# Patient Record
Sex: FEMALE | Race: WHITE | NOT HISPANIC OR LATINO | Employment: UNEMPLOYED | URBAN - METROPOLITAN AREA
[De-identification: names, ages, dates, MRNs, and addresses within clinical notes are randomized per-mention and may not be internally consistent; named-entity substitution may affect disease eponyms.]

---

## 2017-07-14 ENCOUNTER — LAB VISIT (OUTPATIENT)
Dept: LAB | Facility: HOSPITAL | Age: 32
End: 2017-07-14
Attending: INTERNAL MEDICINE
Payer: COMMERCIAL

## 2017-07-14 DIAGNOSIS — D50.0 ANEMIA DUE TO CHRONIC BLOOD LOSS: ICD-10-CM

## 2017-07-14 LAB
BASOPHILS # BLD AUTO: 0.04 K/UL
BASOPHILS NFR BLD: 0.4 %
DIFFERENTIAL METHOD: ABNORMAL
EOSINOPHIL # BLD AUTO: 0.3 K/UL
EOSINOPHIL NFR BLD: 3.4 %
ERYTHROCYTE [DISTWIDTH] IN BLOOD BY AUTOMATED COUNT: 22.5 %
FERRITIN SERPL-MCNC: 4 NG/ML
GIANT PLATELETS BLD QL SMEAR: PRESENT
HCT VFR BLD AUTO: 26.2 %
HGB BLD-MCNC: 7.2 G/DL
IRON SATN MFR SERPL: 10 %
IRON SERPL-MCNC: 59 UG/DL
LYMPHOCYTES # BLD AUTO: 2 K/UL
LYMPHOCYTES NFR BLD: 21 %
MCH RBC QN AUTO: 19 PG
MCHC RBC AUTO-ENTMCNC: 27.5 %
MCV RBC AUTO: 69 FL
MONOCYTES # BLD AUTO: 0.6 K/UL
MONOCYTES NFR BLD: 6.4 %
NEUTROPHILS # BLD AUTO: 6.4 K/UL
NEUTROPHILS NFR BLD: 68.8 %
NRBC BLD-RTO: 0 /100 WBC
PLATELET # BLD AUTO: 274 K/UL
PMV BLD AUTO: 9.1 FL
RBC # BLD AUTO: 3.79 M/UL
TOTAL IRON BINDING CAPACITY: 584 UG/DL
WBC # BLD AUTO: 9.28 K/UL

## 2017-07-14 PROCEDURE — 85025 COMPLETE CBC W/AUTO DIFF WBC: CPT | Mod: PN

## 2017-07-14 PROCEDURE — 85025 COMPLETE CBC W/AUTO DIFF WBC: CPT

## 2017-07-14 PROCEDURE — 82728 ASSAY OF FERRITIN: CPT | Mod: PN

## 2017-07-14 PROCEDURE — 83540 ASSAY OF IRON: CPT | Mod: PN

## 2017-07-14 PROCEDURE — 36415 COLL VENOUS BLD VENIPUNCTURE: CPT | Mod: PN

## 2017-07-14 PROCEDURE — 83540 ASSAY OF IRON: CPT

## 2017-07-14 PROCEDURE — 82728 ASSAY OF FERRITIN: CPT

## 2017-07-24 PROBLEM — D64.9 ANEMIA: Status: ACTIVE | Noted: 2017-07-24

## 2017-07-25 PROBLEM — R82.71 BACTERIURIA DURING PREGNANCY: Status: ACTIVE | Noted: 2017-07-25

## 2017-07-25 PROBLEM — M54.9 BACK PAIN: Status: ACTIVE | Noted: 2017-07-25

## 2017-07-25 PROBLEM — O99.891 BACTERIURIA DURING PREGNANCY: Status: ACTIVE | Noted: 2017-07-25

## 2017-08-10 ENCOUNTER — LAB VISIT (OUTPATIENT)
Dept: LAB | Facility: HOSPITAL | Age: 32
End: 2017-08-10
Attending: INTERNAL MEDICINE
Payer: COMMERCIAL

## 2017-08-10 DIAGNOSIS — D50.0 ANEMIA DUE TO CHRONIC BLOOD LOSS: ICD-10-CM

## 2017-08-10 LAB
ALBUMIN SERPL BCP-MCNC: 3.6 G/DL
ALP SERPL-CCNC: 68 U/L
ALT SERPL W/O P-5'-P-CCNC: 18 U/L
ANION GAP SERPL CALC-SCNC: 6 MMOL/L
ANISOCYTOSIS BLD QL SMEAR: SLIGHT
AST SERPL-CCNC: 17 U/L
BASOPHILS # BLD AUTO: 0.05 K/UL
BASOPHILS NFR BLD: 0.6 %
BILIRUB SERPL-MCNC: 0.6 MG/DL
BUN SERPL-MCNC: 10 MG/DL
CALCIUM SERPL-MCNC: 9.2 MG/DL
CHLORIDE SERPL-SCNC: 105 MMOL/L
CO2 SERPL-SCNC: 24 MMOL/L
CREAT SERPL-MCNC: 0.58 MG/DL
DIFFERENTIAL METHOD: ABNORMAL
EOSINOPHIL # BLD AUTO: 0.3 K/UL
EOSINOPHIL NFR BLD: 3.6 %
ERYTHROCYTE [DISTWIDTH] IN BLOOD BY AUTOMATED COUNT: ABNORMAL %
EST. GFR  (AFRICAN AMERICAN): >60 ML/MIN/1.73 M^2
EST. GFR  (NON AFRICAN AMERICAN): >60 ML/MIN/1.73 M^2
FERRITIN SERPL-MCNC: 42 NG/ML
GLUCOSE SERPL-MCNC: 66 MG/DL
HCT VFR BLD AUTO: 34.1 %
HGB BLD-MCNC: 10.5 G/DL
IRON SATN MFR SERPL: 13 %
IRON SERPL-MCNC: 67 UG/DL
IRON SERPL-MCNC: 67 UG/DL
LYMPHOCYTES # BLD AUTO: 1.9 K/UL
LYMPHOCYTES NFR BLD: 23.6 %
MCH RBC QN AUTO: 24.9 PG
MCHC RBC AUTO-ENTMCNC: 30.8 G/DL
MCV RBC AUTO: 81 FL
MONOCYTES # BLD AUTO: 0.5 K/UL
MONOCYTES NFR BLD: 5.7 %
NEUTROPHILS # BLD AUTO: 5.2 K/UL
NEUTROPHILS NFR BLD: 66.5 %
NRBC BLD-RTO: 0 /100 WBC
PLATELET # BLD AUTO: 183 K/UL
PMV BLD AUTO: 9.6 FL
POLYCHROMASIA BLD QL SMEAR: ABNORMAL
POTASSIUM SERPL-SCNC: 4.5 MMOL/L
PROT SERPL-MCNC: 6.7 G/DL
RBC # BLD AUTO: 4.21 M/UL
SODIUM SERPL-SCNC: 135 MMOL/L
TOTAL IRON BINDING CAPACITY: 518 UG/DL
WBC # BLD AUTO: 7.88 K/UL

## 2017-08-10 PROCEDURE — 80053 COMPREHEN METABOLIC PANEL: CPT | Mod: PN

## 2017-08-10 PROCEDURE — 36415 COLL VENOUS BLD VENIPUNCTURE: CPT | Mod: PN

## 2017-08-10 PROCEDURE — 83540 ASSAY OF IRON: CPT | Mod: PN

## 2017-08-10 PROCEDURE — 82728 ASSAY OF FERRITIN: CPT

## 2017-08-10 PROCEDURE — 85025 COMPLETE CBC W/AUTO DIFF WBC: CPT | Mod: PN

## 2017-08-10 PROCEDURE — 83540 ASSAY OF IRON: CPT

## 2017-08-10 PROCEDURE — 82728 ASSAY OF FERRITIN: CPT | Mod: PN

## 2017-08-10 PROCEDURE — 85025 COMPLETE CBC W/AUTO DIFF WBC: CPT

## 2017-08-10 PROCEDURE — 80053 COMPREHEN METABOLIC PANEL: CPT

## 2017-09-07 ENCOUNTER — LAB VISIT (OUTPATIENT)
Dept: LAB | Facility: HOSPITAL | Age: 32
End: 2017-09-07
Attending: INTERNAL MEDICINE
Payer: COMMERCIAL

## 2017-09-07 DIAGNOSIS — D50.0 ANEMIA DUE TO CHRONIC BLOOD LOSS: ICD-10-CM

## 2017-09-07 LAB
ALBUMIN SERPL BCP-MCNC: 3.5 G/DL
ALP SERPL-CCNC: 89 U/L
ALT SERPL W/O P-5'-P-CCNC: 21 U/L
ANION GAP SERPL CALC-SCNC: 7 MMOL/L
ANISOCYTOSIS BLD QL SMEAR: SLIGHT
AST SERPL-CCNC: 16 U/L
BASOPHILS # BLD AUTO: 0.04 K/UL
BASOPHILS NFR BLD: 0.3 %
BILIRUB SERPL-MCNC: 0.5 MG/DL
BUN SERPL-MCNC: 13 MG/DL
CALCIUM SERPL-MCNC: 9.1 MG/DL
CHLORIDE SERPL-SCNC: 107 MMOL/L
CO2 SERPL-SCNC: 23 MMOL/L
CREAT SERPL-MCNC: 0.66 MG/DL
DIFFERENTIAL METHOD: ABNORMAL
DOHLE BOD BLD QL SMEAR: PRESENT
EOSINOPHIL # BLD AUTO: 0.2 K/UL
EOSINOPHIL NFR BLD: 1.4 %
ERYTHROCYTE [DISTWIDTH] IN BLOOD BY AUTOMATED COUNT: 22.7 %
EST. GFR  (AFRICAN AMERICAN): >60 ML/MIN/1.73 M^2
EST. GFR  (NON AFRICAN AMERICAN): >60 ML/MIN/1.73 M^2
FERRITIN SERPL-MCNC: 8 NG/ML
GIANT PLATELETS BLD QL SMEAR: PRESENT
GLUCOSE SERPL-MCNC: 64 MG/DL
HCT VFR BLD AUTO: 35.6 %
HGB BLD-MCNC: 11.3 G/DL
IRON SATN MFR SERPL: 18 %
IRON SERPL-MCNC: 99 UG/DL
IRON SERPL-MCNC: 99 UG/DL
LYMPHOCYTES # BLD AUTO: 1.8 K/UL
LYMPHOCYTES NFR BLD: 15.5 %
MCH RBC QN AUTO: 26 PG
MCHC RBC AUTO-ENTMCNC: 31.7 G/DL
MCV RBC AUTO: 82 FL
MONOCYTES # BLD AUTO: 0.7 K/UL
MONOCYTES NFR BLD: 5.6 %
NEUTROPHILS # BLD AUTO: 9.1 K/UL
NEUTROPHILS NFR BLD: 77.2 %
NRBC BLD-RTO: 0 /100 WBC
PLATELET # BLD AUTO: 202 K/UL
PLATELET BLD QL SMEAR: ABNORMAL
PMV BLD AUTO: 9.8 FL
POTASSIUM SERPL-SCNC: 3.8 MMOL/L
PROT SERPL-MCNC: 6.7 G/DL
RBC # BLD AUTO: 4.34 M/UL
SODIUM SERPL-SCNC: 137 MMOL/L
TOTAL IRON BINDING CAPACITY: 543 UG/DL
TOXIC GRANULES BLD QL SMEAR: PRESENT
WBC # BLD AUTO: 11.82 K/UL

## 2017-09-07 PROCEDURE — 36415 COLL VENOUS BLD VENIPUNCTURE: CPT | Mod: PN

## 2017-09-07 PROCEDURE — 85025 COMPLETE CBC W/AUTO DIFF WBC: CPT

## 2017-09-07 PROCEDURE — 82728 ASSAY OF FERRITIN: CPT | Mod: PN

## 2017-09-07 PROCEDURE — 82728 ASSAY OF FERRITIN: CPT

## 2017-09-07 PROCEDURE — 80053 COMPREHEN METABOLIC PANEL: CPT | Mod: PN

## 2017-09-07 PROCEDURE — 80053 COMPREHEN METABOLIC PANEL: CPT

## 2017-09-07 PROCEDURE — 83540 ASSAY OF IRON: CPT

## 2017-09-07 PROCEDURE — 85025 COMPLETE CBC W/AUTO DIFF WBC: CPT | Mod: PN

## 2017-09-07 PROCEDURE — 83540 ASSAY OF IRON: CPT | Mod: PN

## 2017-09-11 PROBLEM — O36.8190 DECREASED FETAL MOVEMENT: Status: ACTIVE | Noted: 2017-09-11

## 2020-04-30 ENCOUNTER — NURSE TRIAGE (OUTPATIENT)
Dept: ADMINISTRATIVE | Facility: CLINIC | Age: 35
End: 2020-04-30

## 2020-04-30 NOTE — TELEPHONE ENCOUNTER
Chills, body aches, sore throat, fever (T max 100) through out the night. Referred to [facility]. Verbalized understanding.     Reason for Disposition   COVID-19 Testing, questions about    Additional Information   Negative: SEVERE difficulty breathing (e.g., struggling for each breath, speaks in single words)   Negative: Difficult to awaken or acting confused (e.g., disoriented, slurred speech)   Negative: Bluish (or gray) lips or face now   Negative: Shock suspected (e.g., cold/pale/clammy skin, too weak to stand, low BP, rapid pulse)   Negative: Sounds like a life-threatening emergency to the triager   Negative: SEVERE or constant chest pain (Exception: mild central chest pain, present only when coughing)   Negative: MODERATE difficulty breathing (e.g., speaks in phrases, SOB even at rest, pulse 100-120)   Negative: MILD difficulty breathing (e.g., minimal/no SOB at rest, SOB with walking, pulse <100)   Negative: Chest pain   Negative: Patient sounds very sick or weak to the triager   Negative: Fever > 103 F (39.4 C)   Negative: [1] Fever > 101 F (38.3 C) AND [2] age > 60   Negative: [1] Fever > 100.0 F (37.8 C) AND [2] bedridden (e.g., nursing home patient, CVA, chronic illness, recovering from surgery)   Negative: HIGH RISK patient (e.g., age > 64 years, diabetes, heart or lung disease, weak immune system)   Negative: Fever present > 3 days (72 hours)   Negative: [1] Fever returns after gone for over 24 hours AND [2] symptoms worse or not improved   Negative: [1] Continuous (nonstop) coughing interferes with work or school AND [2] no improvement using cough treatment per protocol   Negative: Cough present > 3 weeks    Protocols used: CORONAVIRUS (COVID-19) DIAGNOSED OR [id number]

## 2021-01-07 PROBLEM — U07.1 COVID-19: Status: ACTIVE | Noted: 2021-01-07

## 2022-10-25 PROBLEM — D50.9 IRON DEFICIENCY ANEMIA, UNSPECIFIED: Status: ACTIVE | Noted: 2022-10-25
